# Patient Record
Sex: FEMALE | Race: OTHER | Employment: UNEMPLOYED | URBAN - NONMETROPOLITAN AREA
[De-identification: names, ages, dates, MRNs, and addresses within clinical notes are randomized per-mention and may not be internally consistent; named-entity substitution may affect disease eponyms.]

---

## 2017-07-30 LAB
ALBUMIN SERPL-MCNC: 4.3 G/DL
ALP BLD-CCNC: 67 U/L
ALT SERPL-CCNC: 33 U/L
ANION GAP SERPL CALCULATED.3IONS-SCNC: NORMAL MMOL/L
AST SERPL-CCNC: 31 U/L
BASOPHILS ABSOLUTE: 0 /ΜL
BASOPHILS RELATIVE PERCENT: 1 %
BILIRUB SERPL-MCNC: 0.3 MG/DL (ref 0.1–1.4)
BUN BLDV-MCNC: 6 MG/DL
CALCIUM SERPL-MCNC: 9.2 MG/DL
CHLORIDE BLD-SCNC: 104 MMOL/L
CHOLESTEROL, TOTAL: 158 MG/DL
CHOLESTEROL/HDL RATIO: NORMAL
CO2: 22 MMOL/L
CREAT SERPL-MCNC: 0.48 MG/DL
EOSINOPHILS ABSOLUTE: 0.1 /ΜL
EOSINOPHILS RELATIVE PERCENT: 2 %
GFR CALCULATED: NORMAL
GLUCOSE BLD-MCNC: 77 MG/DL
HCT VFR BLD CALC: 32.6 % (ref 36–46)
HDLC SERPL-MCNC: 70 MG/DL (ref 35–70)
HEMOGLOBIN: 10.9 G/DL (ref 12–16)
LDL CHOLESTEROL CALCULATED: 63 MG/DL (ref 0–160)
LYMPHOCYTES ABSOLUTE: 2.9 /ΜL
LYMPHOCYTES RELATIVE PERCENT: 51 %
MCH RBC QN AUTO: 29.2 PG
MCHC RBC AUTO-ENTMCNC: 33.4 G/DL
MCV RBC AUTO: 87 FL
MONOCYTES ABSOLUTE: 0.5 /ΜL
MONOCYTES RELATIVE PERCENT: 8 %
NEUTROPHILS ABSOLUTE: 2.1 /ΜL
NEUTROPHILS RELATIVE PERCENT: 38 %
PDW BLD-RTO: 14 %
PLATELET # BLD: 355 K/ΜL
PMV BLD AUTO: ABNORMAL FL
POTASSIUM SERPL-SCNC: 4.1 MMOL/L
RBC # BLD: 3.73 10^6/ΜL
SODIUM BLD-SCNC: 142 MMOL/L
TOTAL PROTEIN: 7.3
TRIGL SERPL-MCNC: 127 MG/DL
VLDLC SERPL CALC-MCNC: 25 MG/DL
WBC # BLD: 5.6 10^3/ML

## 2017-09-29 ENCOUNTER — OFFICE VISIT (OUTPATIENT)
Dept: UROLOGY | Age: 32
End: 2017-09-29

## 2017-09-29 VITALS
WEIGHT: 119 LBS | DIASTOLIC BLOOD PRESSURE: 60 MMHG | BODY MASS INDEX: 24.98 KG/M2 | HEIGHT: 58 IN | SYSTOLIC BLOOD PRESSURE: 102 MMHG

## 2017-09-29 DIAGNOSIS — R10.9 RIGHT FLANK PAIN: ICD-10-CM

## 2017-09-29 DIAGNOSIS — N28.1 RENAL CYST: Primary | ICD-10-CM

## 2017-09-29 LAB
BILIRUBIN URINE: NEGATIVE
BLOOD URINE, POC: NEGATIVE
CHARACTER, URINE: CLEAR
COLOR, URINE: YELLOW
GLUCOSE URINE: NEGATIVE MG/DL
KETONES, URINE: NEGATIVE
LEUKOCYTE CLUMPS, URINE: NORMAL
NITRITE, URINE: NEGATIVE
PH, URINE: 7
PROTEIN, URINE: NEGATIVE MG/DL
SPECIFIC GRAVITY, URINE: 1.01 (ref 1–1.03)
UROBILINOGEN, URINE: 0.2 EU/DL

## 2017-09-29 PROCEDURE — 99203 OFFICE O/P NEW LOW 30 MIN: CPT | Performed by: NURSE PRACTITIONER

## 2017-09-29 PROCEDURE — 81003 URINALYSIS AUTO W/O SCOPE: CPT | Performed by: NURSE PRACTITIONER

## 2017-09-29 RX ORDER — KETOROLAC TROMETHAMINE 10 MG/1
10 TABLET, FILM COATED ORAL EVERY 6 HOURS PRN
Qty: 30 TABLET | Refills: 0 | Status: SHIPPED | OUTPATIENT
Start: 2017-09-29

## 2017-10-09 ENCOUNTER — HOSPITAL ENCOUNTER (OUTPATIENT)
Dept: MRI IMAGING | Age: 32
Discharge: HOME | End: 2017-10-09

## 2017-10-09 DIAGNOSIS — N28.1 RENAL CYST: ICD-10-CM

## 2017-10-09 DIAGNOSIS — R10.9 RIGHT FLANK PAIN: ICD-10-CM

## 2017-10-09 PROCEDURE — 6360000004 HC RX CONTRAST MEDICATION: Performed by: NURSE PRACTITIONER

## 2017-10-09 PROCEDURE — 74183 MRI ABD W/O CNTR FLWD CNTR: CPT

## 2017-10-09 PROCEDURE — A9579 GAD-BASE MR CONTRAST NOS,1ML: HCPCS | Performed by: NURSE PRACTITIONER

## 2017-10-09 RX ADMIN — GADOTERIDOL 10 ML: 279.3 INJECTION, SOLUTION INTRAVENOUS at 11:00

## 2017-10-27 ENCOUNTER — OFFICE VISIT (OUTPATIENT)
Dept: UROLOGY | Age: 32
End: 2017-10-27

## 2017-10-27 VITALS
BODY MASS INDEX: 24.56 KG/M2 | SYSTOLIC BLOOD PRESSURE: 102 MMHG | WEIGHT: 117 LBS | HEIGHT: 58 IN | DIASTOLIC BLOOD PRESSURE: 52 MMHG

## 2017-10-27 DIAGNOSIS — R10.9 RIGHT FLANK PAIN: Primary | ICD-10-CM

## 2017-10-27 LAB
BILIRUBIN URINE: NEGATIVE
BLOOD URINE, POC: NEGATIVE
CHARACTER, URINE: CLEAR
COLOR, URINE: YELLOW
GLUCOSE URINE: NEGATIVE MG/DL
KETONES, URINE: NEGATIVE
LEUKOCYTE CLUMPS, URINE: NEGATIVE
NITRITE, URINE: NEGATIVE
PH, URINE: 5.5
PROTEIN, URINE: NEGATIVE MG/DL
SPECIFIC GRAVITY, URINE: >= 1.03 (ref 1–1.03)
UROBILINOGEN, URINE: 0.2 EU/DL

## 2017-10-27 PROCEDURE — 99213 OFFICE O/P EST LOW 20 MIN: CPT | Performed by: NURSE PRACTITIONER

## 2017-10-27 PROCEDURE — 81003 URINALYSIS AUTO W/O SCOPE: CPT | Performed by: NURSE PRACTITIONER

## 2017-10-27 NOTE — PROGRESS NOTES
[facility] PROFESSIONAL SERVS  [facility]  [address]  [address]  [place] [address]  Dept: [phone]  Loc: [phone]  Visit Date: 10/27/2017      HPI:     Wendy Talavera follows up today for right flank pain and MRI results. MRI abdomen with without contrast was performed for further evaluation of possible renal cyst. MRI was unremarkable of the kidneys. Specifically no cystic lesion was identified. There is a fluid level dependently within the gallbladder. This may relate to sludge within the gallbladder. She still reports intermittent right flank pain with radiation underneath her right breast. Pain is reproducible with palpation. Urinalysis today is completely negative. She comes in today with significant other and small child. She speaks primarily Citizen of [place] and her significant other had to translate information for the patient and myself. Current Outpatient Prescriptions   Medication Sig Dispense Refill    ketorolac (TORADOL) 10 MG tablet Take 1 tablet by mouth every 6 hours as needed for Pain 30 tablet 0    ondansetron (ZOFRAN) 4 MG tablet Take 1 tablet by mouth every 8 hours as needed for Nausea or Vomiting 6 tablet 0    ibuprofen (ADVIL;MOTRIN) 400 MG tablet Take 600 mg by mouth every 6 hours as needed for Pain        No current facility-administered medications for this visit. Past Medical History  Hazel Novak  has a past medical history of Renal cyst.    Past Surgical History  The patient  has a past surgical history that includes  section. Family History  This patient's family history is not on file. Social History  Hazel Novak  reports that she has never smoked. She has never used smokeless tobacco. She reports that she does not drink alcohol.     Subjective:     Review of Systems  ROS limited due to patient not being able to speak english    Objective:     PE:   Vitals:    10/27/17 1023   BP: (!) 102/52   Weight: 117 lb (53.1 kg)   Height: 4' 10\" (1.473 m) Constitutional: Alert and oriented times 3, no acute distress and cooperative to examination with appropriate mood and affect. HENT:   Head:        Normocephalic and atraumatic. Mouth/Throat:         Mucous membranes are normal.   Eyes:         EOM are normal. No scleral icterus. PERRLA. Neck:        Supple, symmetrical, trachea midline  Pulmonary/Chest:      Chest symmetric with normal A/P diameter, Normal respiratory rate and rhthym. No use of accessory muscles. Abdominal:         Soft. No tenderness. Musculoskeletal:         Normal range of motion. No edema or tenderness of lower extremities. Extremities: No cyanosis, clubbing, or edema present. Neurological:        Alert and oriented. CN II-XII are grossly intact. Psychiatric:        Normal mood and affect. Labs   Urine dip demonstrates   Results for POC orders placed in visit on 10/27/17   POCT Urinalysis No Micro (Auto)   Result Value Ref Range    Glucose, Ur Negative NEGATIVE mg/dl    Bilirubin Urine Negative     Ketones, Urine Negative NEGATIVE    Specific Gravity, Urine >= 1.030 1.002 - 1.03    Blood, UA POC Negative NEGATIVE    pH, Urine 5.50 5.0 - 9.0    Protein, Urine Negative NEGATIVE mg/dl    Urobilinogen, Urine 0.20 0.0 - 1.0 eu/dl    Nitrite, Urine Negative NEGATIVE    Leukocyte Clumps, Urine Negative NEGATIVE    Color, Urine Yellow YELLOW-STR    Character, Urine Clear CLR-SL.ODESSA      Recent BUN/Creatinine:  Lab Results   Component Value Date    BUN 6 07/30/2017    CREATININE 0.48 07/30/2017       Radiology  The patient has had a Renal Ultrasound and CT Stone Protocol which I have reviewed along with its accompanying report. Renal US         CT Abdomen Pelvis w IV contrast: 07/07/2017  Addendum   ADDENDUM #1       Addendum:       The superior pole of the right kidney shows a heterogeneous enhancement    pattern of the cortex. This would be consistent with pyelonephritis.     Please correlate with laboratory and AIR/FREE FLUID/INFLAMMATION: None.       LYMPHADENOPATHY:    There are no pathologically enlarged lymph nodes.       ABDOMINAL AORTA: Normal.       MUSCULOSKELETAL: Normal.   Impression   1. Unremarkable MRI of the kidneys. Specifically, no cystic lesion is identified.       2. There is a fluid level dependently within the gallbladder. This may relate to sludge within the gallbladder. Assessment & Plan:     Right flank pain    [address] up today for MRI results. MRI of the kidneys was unremarkable. No cyst or lesion was identified. MRI did comment about fluid within the gallbladder, which may be possible cause of her right flank, RUQ pain??? There appears to be no urological cause for patient's pain. She is to follow up with her PCP. MRI report was discussed with patient and significant other, report of MRI was also given to them. Return if symptoms worsen or fail to improve.     [id number] [facility]

## 2017-11-13 ENCOUNTER — HOSPITAL ENCOUNTER (OUTPATIENT)
Dept: ULTRASOUND IMAGING | Age: 32
Discharge: HOME | End: 2017-11-13

## 2017-11-13 ENCOUNTER — HOSPITAL ENCOUNTER (OUTPATIENT)
Dept: GENERAL RADIOLOGY | Age: 32
Discharge: HOME | End: 2017-11-13

## 2017-11-13 DIAGNOSIS — R52 PAIN: ICD-10-CM

## 2017-11-13 DIAGNOSIS — M53.9 DISORDER OF BACK: ICD-10-CM

## 2017-11-13 PROCEDURE — 72100 X-RAY EXAM L-S SPINE 2/3 VWS: CPT

## 2017-11-13 PROCEDURE — 76705 ECHO EXAM OF ABDOMEN: CPT
